# Patient Record
(demographics unavailable — no encounter records)

---

## 2025-02-10 NOTE — PHYSICAL EXAM
[Alert] : alert [Normocephalic] : normocephalic [Flat Open Anterior [place]] : flat open anterior fontanelle [Red Reflex] : red reflex bilateral [PERRL] : PERRL [Normally Placed Ears] : normally placed ears [Auricles Well Formed] : auricles well formed [Clear Tympanic membranes] : clear tympanic membranes [Light reflex present] : light reflex present [Bony landmarks visible] : bony landmarks visible [Nares Patent] : nares patent [Palate Intact] : palate intact [Uvula Midline] : uvula midline [Symmetric Chest Rise] : symmetric chest rise [Clear to Auscultation Bilaterally] : clear to auscultation bilaterally [Regular Rate and Rhythm] : regular rate and rhythm [S1, S2 present] : S1, S2 present [+2 Femoral Pulses] : (+) 2 femoral pulses [Soft] : soft [Bowel Sounds] : bowel sounds present [Normal External Genitalia] : normal external genitalia [Normal Vaginal Introitus] : normal vaginal introitus [Patent] : patent [Normally Placed] : normally placed [No Abnormal Lymph Nodes Palpated] : no abnormal lymph nodes palpated [Startle Reflex] : startle reflex present [Plantar Grasp] : plantar grasp reflex present [Symmetric Mark] : symmetric mark [Acute Distress] : no acute distress [Discharge] : no discharge [Palpable Masses] : no palpable masses [Murmurs] : no murmurs [Tender] : nontender [Distended] : nondistended [Hepatomegaly] : no hepatomegaly [Splenomegaly] : no splenomegaly [Clitoromegaly] : no clitoromegaly [Nick-Ortolani] : negative Nick-Ortolani [Spinal Dimple] : no spinal dimple [Tuft of Hair] : no tuft of hair [Rash or Lesions] : no rash/lesions [FreeTextEntry9] : + umbilical hernia

## 2025-02-10 NOTE — DISCUSSION/SUMMARY
[Normal Growth] : growth [Normal Development] : development  [Continue Regimen] : feeding [No Skin Concerns] : skin [Normal Sleep Pattern] : sleep [Term Infant] : term infant [Family Functioning] : family functioning [Nutritional Adequacy and Growth] : nutritional adequacy and growth [Infant Development] : infant development [Oral Health] : oral health [Safety] : safety [Age Approp Vaccines] : Age appropriate vaccines administered [DTaP] : diptheria, tetanus and pertussis [HiB] : haemophilus influenzae type B [IPV] : inactivated poliovirus [Rotavirus] : rotavirus [Father] : father [de-identified] : HAd one stool in 5 days last week which is not norm for child but patient was still feeding well, with no assoc pain, irritability, or emesis. Bowel movements have since normalized. [FreeTextEntry1] : Patient is a 4-month-old female, with no PMHx, who presents to clinic for WCC. Patient tracking well on growth charts. Dad's only concern is that child went 5 days without stooling last week without any other associated sx which has since normalized. Parent endorsed URI sx a few weeks ago which they treated with Motrin at home. Reassuring physical exam. Patient will recieve age appropriate vaccinations and follow up at 6 months.  HCM -DTaP- IPV-HiB, PCV, Rotavirus vaccinations - Counseled against Co-sleeping with family for risk of SIDS. Dad showed understanding. - Discussed that some babies will only stool up to once per week and it can be normal for them. Symptoms to look out for include irritability, dec appetite, dec energy, emesis, inc sleeping. - Counseled about avoiding motrin for fevers until after 6 months of age due to risk of renal sequelae. Father showed understanding. - Expressed that child may be ready to begin solids around time of next visit. Discussed that parent can put cereal powder in bowl with some milk and preload on spoon for patient around 1 week before coming to 6 month visit. -Follow up at 6 months of age or sooner if necessary.

## 2025-02-10 NOTE — DEVELOPMENTAL MILESTONES
[Laughs aloud] : laughs aloud [Turns to voice] : turns to voice [Vocalizes with extending cooing] : vocalizes with extending cooing [Rolls over prone to supine] : rolls over prone to supine [Supports on elbows & wrists in prone] : supports on elbows and wrists in prone [Keeps hands unfisted] : keeps hands unfisted [Grasps objects] : grasps objects [Normal Development] : Normal Development [None] : none

## 2025-02-10 NOTE — HISTORY OF PRESENT ILLNESS
[Father] : father [Breast milk] : breast milk [Normal] : Normal [every ___ day(s)] : every [unfilled] day(s). [Yellow] : yellow [Seedy] : seedy [In Bassinet/Crib] : sleeps in bassinet/crib [On back] : sleeps on back [Co-sleeping] : co-sleeping [Sleeps 12-16 hours per 24 hours (including naps)] : sleeps 12-16 hours per 24 hours (including naps) [Pacifier use] : Pacifier use [Tummy time] : tummy time [No] : No cigarette smoke exposure [Rear facing car seat in back seat] : Rear facing car seat in back seat [Carbon Monoxide Detectors] : Carbon monoxide detectors at home [Smoke Detectors] : Smoke detectors at home. [NO] : No [Loose bedding, pillow, toys, and/or bumpers in crib] : no loose bedding, pillow, toys, and/or bumpers in crib [de-identified] : implementing some Similac. Feeds q3-4h. Time spent on breast per feed 20mins per side.  [FreeTextEntry3] : 4-5x naps/day [de-identified] : [id number], PCV, Rotavirus

## 2025-03-12 NOTE — PHYSICAL EXAM
[Alert] : alert [Playful] : playful [Tooth Eruption] : tooth eruption  [Clear to Auscultation Bilaterally] : clear to auscultation bilaterally [NL] : warm, clear [de-identified] : white plaques on tongue

## 2025-03-12 NOTE — DISCUSSION/SUMMARY
[FreeTextEntry1] : 5M old here for Fever Endorses mild congestion 2 days ago, but now subsided, denies other URI symptoms Advised to monitor, and if fever persisting for the next 48 hours RTC for re-assessment, consider urine cath to r/o UTI Monitor for hydration status, if not wet diapers within 8 hours, go to ED  ORAL THRUSH White plaques likely oral thrush. Recommend nystatin up to 4 times per day. Sterilize bottles and nipples.

## 2025-03-12 NOTE — HISTORY OF PRESENT ILLNESS
[FreeTextEntry6] : fever x3 days, last fever 10 hours ago, 101, relieved with tylenol tolerating breast feeding reports mild congestion 2 days ago, but now improved MOC had cold last week denies n/v/d drinking at baseline denies foul odor to urine active and playful

## 2025-04-11 NOTE — REVIEW OF SYSTEMS
[Nasal Discharge] : no nasal discharge [Nasal Congestion] : nasal congestion [Cough] : no cough [Spitting Up] : spitting up [Vomiting] : no vomiting [Rash] : no rash [Negative] : Genitourinary

## 2025-04-11 NOTE — PHYSICAL EXAM
[Alert] : alert [Acute Distress] : no acute distress [Playful] : playful [Normocephalic] : normocephalic [Flat Open Anterior [place]] : flat open anterior fontanelle [Red Reflex] : red reflex bilateral [PERRL] : PERRL [Normally Placed Ears] : normally placed ears [Auricles Well Formed] : auricles well formed [Clear Tympanic membranes] : clear tympanic membranes [Light reflex present] : light reflex present [Discharge] : no discharge [Nares Patent] : nares patent [Palate Intact] : palate intact [Tooth Eruption] : no tooth eruption [Supple, full passive range of motion] : supple, full passive range of motion [Clear to Auscultation Bilaterally] : clear to auscultation bilaterally [Regular Rate and Rhythm] : regular rate and rhythm [S1, S2 present] : S1, S2 present [Murmurs] : no murmurs [+2 Femoral Pulses] : (+) 2 femoral pulses [Soft] : soft [Tender] : nontender [Distended] : nondistended [Bowel Sounds] : bowel sounds present [Normal External Genitalia] : normal external genitalia [Clitoromegaly] : no clitoromegaly [Normal Vaginal Introitus] : normal vaginal introitus [Patent] : patent [Normally Placed] : normally placed [Nick-Ortolani] : negative Nick-Ortolani [Allis Sign] : negative Allis sign [Symmetric Buttocks Creases] : symmetric buttocks creases [Spinal Dimple] : no spinal dimple [Tuft of Hair] : no tuft of hair [Dermal Melanocytosis] : Dermal Melanocytosis [de-identified] : wide-eyed, well-appearing, maintains excellent eye contact [de-identified] : grossly normal tone and strength

## 2025-04-11 NOTE — HISTORY OF PRESENT ILLNESS
[PCV 20] : PCV 20 [Influenza] : Influenza [DTaP/IPV/Hib/HepB] : DTaP/IPV/Hib/HepB [Rotavirus] : Rotavirus

## 2025-04-11 NOTE — HISTORY OF PRESENT ILLNESS
[Parents] : parents [Breast milk] : breast milk [Formula ___ oz/feed] : [unfilled] oz of formula per feed [Vitamins ___] : Patient takes [unfilled] vitamins daily [Fruits] : fruits [Vegetables] : vegetables [Normal] : Normal [Frequency of stools: ___] : Frequency of stools: [unfilled]  stools [per day] : per day. [Co-sleeping] : co-sleeping [Tummy time] : tummy time [No] : No cigarette smoke exposure [Rear facing car seat in back seat] : Rear facing car seat in back seat [In Bassinet/Crib] : does not sleep in bassinet/crib [On back] : sleeps on back [Pacifier use] : Pacifier use [Screen time only for video chatting] : screen time not just for video chatting [Exposure to electronic nicotine delivery system] : No exposure to electronic nicotine delivery system [de-identified] : no interval ER/UC visits  presumed roseola in March [de-identified] : inconsistently

## 2025-04-11 NOTE — DISCUSSION/SUMMARY
[FreeTextEntry1] : ERIC thigh: Merly DEMPSEY thigh: Prevnar 20, Fluzone  Oral: Rotateq   Pt tolerated well  [] : The components of the vaccine(s) to be administered today are listed in the plan of care. The disease(s) for which the vaccine(s) are intended to prevent and the risks have been discussed with the caretaker.  The risks are also included in the appropriate vaccination information statements which have been provided to the patient's caregiver.  The caregiver has given consent to vaccinate.

## 2025-04-11 NOTE — PHYSICAL EXAM
[Alert] : alert [Acute Distress] : no acute distress [Playful] : playful [Normocephalic] : normocephalic [Flat Open Anterior [place]] : flat open anterior fontanelle [Red Reflex] : red reflex bilateral [PERRL] : PERRL [Normally Placed Ears] : normally placed ears [Auricles Well Formed] : auricles well formed [Clear Tympanic membranes] : clear tympanic membranes [Light reflex present] : light reflex present [Discharge] : no discharge [Nares Patent] : nares patent [Palate Intact] : palate intact [Tooth Eruption] : no tooth eruption [Supple, full passive range of motion] : supple, full passive range of motion [Clear to Auscultation Bilaterally] : clear to auscultation bilaterally [Regular Rate and Rhythm] : regular rate and rhythm [S1, S2 present] : S1, S2 present [Murmurs] : no murmurs [+2 Femoral Pulses] : (+) 2 femoral pulses [Soft] : soft [Tender] : nontender [Distended] : nondistended [Bowel Sounds] : bowel sounds present [Normal External Genitalia] : normal external genitalia [Clitoromegaly] : no clitoromegaly [Normal Vaginal Introitus] : normal vaginal introitus [Patent] : patent [Normally Placed] : normally placed [Nick-Ortolani] : negative Nick-Ortolani [Allis Sign] : negative Allis sign [Symmetric Buttocks Creases] : symmetric buttocks creases [Spinal Dimple] : no spinal dimple [Tuft of Hair] : no tuft of hair [Dermal Melanocytosis] : Dermal Melanocytosis [de-identified] : wide-eyed, well-appearing, maintains excellent eye contact [de-identified] : grossly normal tone and strength

## 2025-04-11 NOTE — DEVELOPMENTAL MILESTONES
[Normal Development] : Normal Development [None] : none [Pats or smiles at reflection] : pats or smiles at reflection [Begins to turn when name called] : begins to turn when name called [Babbles] : does not babble [Rolls over prone to supine] : rolls over prone to supine [Sits briefly without support] : sits briefly without support [Reaches for object and transfers] : reaches for object and transfers [Rakes small object with 4 fingers] : rakes small object with 4 fingers [FreeTextEntry1] : coos and squeals does not roll over supine to prone

## 2025-04-11 NOTE — HISTORY OF PRESENT ILLNESS
[Parents] : parents [Breast milk] : breast milk [Formula ___ oz/feed] : [unfilled] oz of formula per feed [Vitamins ___] : Patient takes [unfilled] vitamins daily [Fruits] : fruits [Vegetables] : vegetables [Normal] : Normal [Frequency of stools: ___] : Frequency of stools: [unfilled]  stools [per day] : per day. [Co-sleeping] : co-sleeping [Tummy time] : tummy time [No] : No cigarette smoke exposure [Rear facing car seat in back seat] : Rear facing car seat in back seat [In Bassinet/Crib] : does not sleep in bassinet/crib [On back] : sleeps on back [Pacifier use] : Pacifier use [Screen time only for video chatting] : screen time not just for video chatting [Exposure to electronic nicotine delivery system] : No exposure to electronic nicotine delivery system [de-identified] : no interval ER/UC visits  presumed roseola in March [de-identified] : inconsistently

## 2025-06-06 NOTE — PHYSICAL EXAM
[Playful] : playful [Discharge] : no discharge [Conjuctival Injection] : no conjunctival injection [Clear] : right tympanic membrane clear [Erythema] : no erythema [Bulging] : no bulging [Clear Effusion] : clear effusion [Pink Nasal Mucosa] : pink nasal mucosa [Congestion] : congestion [Tooth Eruption] : no tooth eruption  [Inflamed [place]] : gingiva not inflamed [Supple] : supple [Regular Rate and Rhythm] : regular rate and rhythm [Normal S1, S2 audible] : normal S1, S2 audible [Murmurs] : no murmurs [Soft] : soft [Tender] : nontender [Normal Bowel Sounds] : normal bowel sounds [NL] : moves all extremities x4, warm, well perfused x4, capillary refill < 2s [FreeTextEntry2] : AFOF [FreeTextEntry4] : inferior turbinate hypertrophy, minimal audible congestion, no nasal discharge [de-identified] : white plaques on buccal mucosa and lower lip, tongue is clear [de-identified] : erythematous papulopustules on b/l labia majora and perineal region, clustered erythematous papules in L inguinal fold

## 2025-06-06 NOTE — PHYSICAL EXAM
[Playful] : playful [Discharge] : no discharge [Conjuctival Injection] : no conjunctival injection [Clear] : right tympanic membrane clear [Erythema] : no erythema [Bulging] : no bulging [Clear Effusion] : clear effusion [Pink Nasal Mucosa] : pink nasal mucosa [Congestion] : congestion [Tooth Eruption] : no tooth eruption  [Inflamed [place]] : gingiva not inflamed [Supple] : supple [Regular Rate and Rhythm] : regular rate and rhythm [Normal S1, S2 audible] : normal S1, S2 audible [Murmurs] : no murmurs [Soft] : soft [Tender] : nontender [Normal Bowel Sounds] : normal bowel sounds [NL] : moves all extremities x4, warm, well perfused x4, capillary refill < 2s [FreeTextEntry2] : AFOF [FreeTextEntry4] : inferior turbinate hypertrophy, minimal audible congestion, no nasal discharge [de-identified] : white plaques on buccal mucosa and lower lip, tongue is clear [de-identified] : erythematous papulopustules on b/l labia majora and perineal region, clustered erythematous papules in L inguinal fold

## 2025-06-06 NOTE — HISTORY OF PRESENT ILLNESS
[de-identified] : travel visit, diaper rash [FreeTextEntry6] : Intermittent nasal congestion and rhinorrhea for a couple of weeks following last WCC appt Mother used nasal saline, nose denice, vicks humidifier with no improvement She is also using copious baby vicks on nostrils and chest No loud snoring or SOB  Significant diaper rash for past 4 days No recent diarrhea No improvement with zinc oxide creams (desitin and Tubby David diaper cream) Mother noticed thrush? on tongue then lips this week, so she used leftover nystatin 4x/day PRN   Prior concern for thrush at acute visit for fever in March, prescribed nystatin  Formula fed daytime, eats food 2x/day Breast feeding only at night for comfort, resulting in co-sleeping Refuses pacifier and bottle at night

## 2025-06-06 NOTE — HISTORY OF PRESENT ILLNESS
[de-identified] : travel visit, diaper rash [FreeTextEntry6] : Intermittent nasal congestion and rhinorrhea for a couple of weeks following last WCC appt Mother used nasal saline, nose denice, vicks humidifier with no improvement She is also using copious baby vicks on nostrils and chest No loud snoring or SOB  Significant diaper rash for past 4 days No recent diarrhea No improvement with zinc oxide creams (desitin and Tubby David diaper cream) Mother noticed thrush? on tongue then lips this week, so she used leftover nystatin 4x/day PRN   Prior concern for thrush at acute visit for fever in March, prescribed nystatin  Formula fed daytime, eats food 2x/day Breast feeding only at night for comfort, resulting in co-sleeping Refuses pacifier and bottle at night

## 2025-06-06 NOTE — REVIEW OF SYSTEMS
[Nasal Discharge] : nasal discharge [Nasal Congestion] : nasal congestion [Rash] : rash [Negative] : Musculoskeletal [Fussy] : not fussy [Crying] : no crying [Difficulty with Sleep] : difficulty with sleep [Fever] : no fever [Ear Tugging] : no ear tugging [Snoring] : no snoring [Mouth Breathing] : no mouth breathing [Tachypnea] : not tachypneic [Cough] : no cough [Appetite Changes] : no appetite changes [Diarrhea] : no diarrhea [Itching] : no itching [Dysuria] : no dysuria [Urine Odor Foul-smelling] : urine is not foul-smelling [Urine Volume has Decreased] : urine volume has not decreased

## 2025-06-06 NOTE — DISCUSSION/SUMMARY
[FreeTextEntry1] : L. thigh: MMR, VAQTA   R. thigh: Fluzone   Pt tolerated well.   [] : The components of the vaccine(s) to be administered today are listed in the plan of care. The disease(s) for which the vaccine(s) are intended to prevent and the risks have been discussed with the caretaker.  The risks are also included in the appropriate vaccination information statements which have been provided to the patient's caregiver.  The caregiver has given consent to vaccinate. louise rogers

## 2025-07-23 NOTE — PHYSICAL EXAM
[Alert] : alert [Playful] : playful [Normocephalic] : normocephalic [Flat Open Anterior [place]] : flat open anterior fontanelle [Red Reflex] : red reflex bilateral [PERRL] : PERRL [Normally Placed Ears] : normally placed ears [Auricles Well Formed] : auricles well formed [Clear Tympanic membranes] : clear tympanic membranes [Light reflex present] : light reflex present [Nares Patent] : nares patent [Palate Intact] : palate intact [Supple, full passive range of motion] : supple, full passive range of motion [Symmetric Chest Rise] : symmetric chest rise [Clear to Auscultation Bilaterally] : clear to auscultation bilaterally [Regular Rate and Rhythm] : regular rate and rhythm [S1, S2 present] : S1, S2 present [+2 Femoral Pulses] : (+) 2 femoral pulses [Soft] : soft [Bowel Sounds] : bowel sounds present [Normal External Genitalia] : normal external genitalia [Normal Vaginal Introitus] : normal vaginal introitus [Symmetric abduction and rotation of hips] : symmetric abduction and rotation of hips [Straight] : straight [Acute Distress] : no acute distress [Discharge] : no discharge [Tooth Eruption] : no tooth eruption [Erythematous Oropharynx] : nonerythematous oropharynx [Murmurs] : no murmurs [Tender] : nontender [Distended] : nondistended [Clitoromegaly] : no clitoromegaly [Allis Sign] : negative Allis sign [Symmetric Buttocks Creases] : symmetric buttocks creases [de-identified] : adorable, cooperative with exam, maintains excellent eye contact [de-identified] : no thrush [de-identified] : grossly normal tone and strength  [de-identified] : coalescing erythematous papulopustules on b/l labia majora and b/l thighs adjacent to inguinal folds

## 2025-07-23 NOTE — DISCUSSION/SUMMARY
[Normal Growth] : growth [Normal Development] : development [No Elimination Concerns] : elimination [No Feeding Concerns] : feeding [Term Infant] : Term infant [Father] : father [FreeTextEntry1] :  Isela 9 month old FT infant  Gaining weight very well Developing appropriately  Interval hx of wheezing at 8 months of age in context of viral bronchiolitis, received duoneb x1 in ER with improvement but no prescription upon discharge (and no further wheezing or increased WOB) Acute diaper rash unresponsive to nystatin and zinc oxide creams; appearance is c/w candidal dermatitis 1) Health maintenance Continue breastmilk or formula as desired. Increase table foods, 3 meals with 2-3 snacks per day. Incorporate up to 6 oz of fluorinated water daily in a sippy cup. Discussed weaning of bottle and pacifier. Wipe teeth daily with washcloth. When in car, patient should be in rear-facing car seat in back seat. Put baby to sleep in own crib with no loose or soft bedding. Lower crib mattress. Help baby to maintain consistent daily routines and sleep schedule. Recognize stranger anxiety. Ensure home is safe since baby is increasingly mobile. Be within arm's reach of baby at all times. Use consistent, positive discipline. Avoid screen time. Read aloud to baby. - Discussed infant safety and sleep safety at length - Routine CBC and lead testing - Return for 12 month WCC   2) Candidal diaper dermatitis  - Prescribed Clotrimazole cream 3-4x/day for 2 weeks - Continue application of OTC zinc oxide diaper cream 3-4x/day - Apply vaseline/aquaphor over medicated diaper creams - Leave diaper off whenever possible  - Avoid use of baby wipes until rash resolves  3)  Hx of wheezing (1 lifetime episode) - Reviewed supportive care for URI including saline drops, nasal suctioning, humidifier, steam inhalation - Prescribed albuterol nebs PRN (sibling has asthma)

## 2025-07-23 NOTE — PHYSICAL EXAM
[Alert] : alert [Playful] : playful [Normocephalic] : normocephalic [Flat Open Anterior [place]] : flat open anterior fontanelle [Red Reflex] : red reflex bilateral [PERRL] : PERRL [Normally Placed Ears] : normally placed ears [Auricles Well Formed] : auricles well formed [Clear Tympanic membranes] : clear tympanic membranes [Light reflex present] : light reflex present [Nares Patent] : nares patent [Palate Intact] : palate intact [Supple, full passive range of motion] : supple, full passive range of motion [Symmetric Chest Rise] : symmetric chest rise [Clear to Auscultation Bilaterally] : clear to auscultation bilaterally [Regular Rate and Rhythm] : regular rate and rhythm [S1, S2 present] : S1, S2 present [+2 Femoral Pulses] : (+) 2 femoral pulses [Soft] : soft [Bowel Sounds] : bowel sounds present [Normal External Genitalia] : normal external genitalia [Normal Vaginal Introitus] : normal vaginal introitus [Symmetric abduction and rotation of hips] : symmetric abduction and rotation of hips [Straight] : straight [Acute Distress] : no acute distress [Discharge] : no discharge [Tooth Eruption] : no tooth eruption [Erythematous Oropharynx] : nonerythematous oropharynx [Murmurs] : no murmurs [Tender] : nontender [Distended] : nondistended [Clitoromegaly] : no clitoromegaly [Allis Sign] : negative Allis sign [Symmetric Buttocks Creases] : symmetric buttocks creases [de-identified] : adorable, cooperative with exam, maintains excellent eye contact [de-identified] : no thrush [de-identified] : grossly normal tone and strength  [de-identified] : coalescing erythematous papulopustules on b/l labia majora and b/l thighs adjacent to inguinal folds

## 2025-07-23 NOTE — PHYSICAL EXAM
[Alert] : alert [Playful] : playful [Normocephalic] : normocephalic [Flat Open Anterior [place]] : flat open anterior fontanelle [Red Reflex] : red reflex bilateral [PERRL] : PERRL [Normally Placed Ears] : normally placed ears [Auricles Well Formed] : auricles well formed [Clear Tympanic membranes] : clear tympanic membranes [Light reflex present] : light reflex present [Nares Patent] : nares patent [Palate Intact] : palate intact [Supple, full passive range of motion] : supple, full passive range of motion [Symmetric Chest Rise] : symmetric chest rise [Clear to Auscultation Bilaterally] : clear to auscultation bilaterally [Regular Rate and Rhythm] : regular rate and rhythm [S1, S2 present] : S1, S2 present [+2 Femoral Pulses] : (+) 2 femoral pulses [Soft] : soft [Bowel Sounds] : bowel sounds present [Normal External Genitalia] : normal external genitalia [Normal Vaginal Introitus] : normal vaginal introitus [Symmetric abduction and rotation of hips] : symmetric abduction and rotation of hips [Straight] : straight [Acute Distress] : no acute distress [Discharge] : no discharge [Tooth Eruption] : no tooth eruption [Erythematous Oropharynx] : nonerythematous oropharynx [Murmurs] : no murmurs [Tender] : nontender [Distended] : nondistended [Clitoromegaly] : no clitoromegaly [Allis Sign] : negative Allis sign [Symmetric Buttocks Creases] : symmetric buttocks creases [de-identified] : adorable, cooperative with exam, maintains excellent eye contact [de-identified] : no thrush [de-identified] : grossly normal tone and strength  [de-identified] : coalescing erythematous papulopustules on b/l labia majora and b/l thighs adjacent to inguinal folds

## 2025-07-23 NOTE — HISTORY OF PRESENT ILLNESS
[Father] : father [Breast milk] : breast milk [Formula ___ oz in 24 hrs] : [unfilled] oz of formula in 24 hours [Fruit] : fruit [Vegetables] : vegetables [Cereal] : cereal [Meat] : meat [Eggs] : eggs [Fish] : fish [Dairy] : dairy [Baby food] : baby food [Finger foods] : finger foods [Water] : water [Co-sleeping] : co-sleeping [Normal] : Normal [Wakes up at night] : wakes up at night [Pacifier use] : Pacifier use [Sippy Cup use] : sippy cup use [No] : Not at  exposure [Rear facing car seat in  back seat] : Rear facing car seat in  back seat [Up to date] : Up to date [de-identified] : breast feeding at night [Peanut] : no peanut [Sleeps 12-16 hours per 24 hours (including naps)] : Does not sleep 12-16 hours per 24 hours (including naps) [None] : Primary Fluoride Source: None [FreeTextEntry7] : ER visit in June for increased WOB in context of febrile URI, wheezing on exam which responded to duoneb x1, dx with bronchiolitis secondary to parainfluenza  no further wheezing or breathing difficulties  [de-identified] : diaper rash for past 1 week, no improvement with desitin and nystatin  recently changed from pampers to huggies diapers [de-identified] : varied diet, eats pureed and table foods [de-identified] : breast feeds throughout the night [de-identified] : home is largely baby-proofed

## 2025-07-23 NOTE — HISTORY OF PRESENT ILLNESS
[Father] : father [Breast milk] : breast milk [Formula ___ oz in 24 hrs] : [unfilled] oz of formula in 24 hours [Fruit] : fruit [Vegetables] : vegetables [Cereal] : cereal [Meat] : meat [Eggs] : eggs [Fish] : fish [Dairy] : dairy [Baby food] : baby food [Finger foods] : finger foods [Water] : water [Co-sleeping] : co-sleeping [Normal] : Normal [Wakes up at night] : wakes up at night [Pacifier use] : Pacifier use [Sippy Cup use] : sippy cup use [No] : Not at  exposure [Rear facing car seat in  back seat] : Rear facing car seat in  back seat [Up to date] : Up to date [de-identified] : breast feeding at night [Peanut] : no peanut [Sleeps 12-16 hours per 24 hours (including naps)] : Does not sleep 12-16 hours per 24 hours (including naps) [None] : Primary Fluoride Source: None [FreeTextEntry7] : ER visit in June for increased WOB in context of febrile URI, wheezing on exam which responded to duoneb x1, dx with bronchiolitis secondary to parainfluenza  no further wheezing or breathing difficulties  [de-identified] : diaper rash for past 1 week, no improvement with desitin and nystatin  recently changed from pampers to huggies diapers [de-identified] : varied diet, eats pureed and table foods [de-identified] : breast feeds throughout the night [de-identified] : home is largely baby-proofed

## 2025-07-23 NOTE — DEVELOPMENTAL MILESTONES
[Normal Development] : Normal Development [None] : none [Uses basic gestures] : uses basic gestures [Sits well without support] : sits well without support [Transitions between sitting and lying] : transitions between sitting and lying [Balances on hands and knees] : balances on hands and knees [Crawls] : crawls [Picks up small objects with 3 fingers] : picks up small objects with 3 fingers and thumb [Releases objects intentionally] : releases objects intentionally [Yes] : Completed. [Says "Mike" or "Mama"] : does not say "Mike" or "Mama" nonspecifically [FreeTextEntry1] : score 12

## 2025-07-23 NOTE — HISTORY OF PRESENT ILLNESS
[Father] : father [Breast milk] : breast milk [Formula ___ oz in 24 hrs] : [unfilled] oz of formula in 24 hours [Fruit] : fruit [Vegetables] : vegetables [Cereal] : cereal [Meat] : meat [Eggs] : eggs [Fish] : fish [Dairy] : dairy [Baby food] : baby food [Finger foods] : finger foods [Water] : water [Co-sleeping] : co-sleeping [Normal] : Normal [Wakes up at night] : wakes up at night [Pacifier use] : Pacifier use [Sippy Cup use] : sippy cup use [No] : Not at  exposure [Rear facing car seat in  back seat] : Rear facing car seat in  back seat [Up to date] : Up to date [de-identified] : breast feeding at night [Peanut] : no peanut [Sleeps 12-16 hours per 24 hours (including naps)] : Does not sleep 12-16 hours per 24 hours (including naps) [None] : Primary Fluoride Source: None [FreeTextEntry7] : ER visit in June for increased WOB in context of febrile URI, wheezing on exam which responded to duoneb x1, dx with bronchiolitis secondary to parainfluenza  no further wheezing or breathing difficulties  [de-identified] : diaper rash for past 1 week, no improvement with desitin and nystatin  recently changed from pampers to huggies diapers [de-identified] : varied diet, eats pureed and table foods [de-identified] : breast feeds throughout the night [de-identified] : home is largely baby-proofed